# Patient Record
Sex: FEMALE | Race: WHITE | NOT HISPANIC OR LATINO | URBAN - METROPOLITAN AREA
[De-identification: names, ages, dates, MRNs, and addresses within clinical notes are randomized per-mention and may not be internally consistent; named-entity substitution may affect disease eponyms.]

---

## 2022-02-15 NOTE — PATIENT DISCUSSION
Patient prefers to stay with Biofinity if possible. Discusses removing nightly and cleaning with ClearCare to remove proteins. Also recommend rewetting drops throughout the day and BLINK-n-Clear drops to help with proteins. If continue to have deposits, check for GPC and consider adding daily antihistamine drop.

## 2022-03-03 ENCOUNTER — PREPPED CHART (OUTPATIENT)
Dept: URBAN - METROPOLITAN AREA CLINIC 10 | Facility: CLINIC | Age: 72
End: 2022-03-03

## 2022-03-03 ASSESSMENT — TONOMETRY
OS_IOP_MMHG: 8
OD_IOP_MMHG: 9

## 2022-03-07 ASSESSMENT — TONOMETRY
OD_IOP_MMHG: 12
OS_IOP_MMHG: 10

## 2022-03-29 NOTE — PATIENT DISCUSSION
Patient given trials for (monovision) contacts. OD +2.00 and +2.50 in Precision1 and OS +3.00 in DTO.

## 2022-04-05 NOTE — PROCEDURE NOTE: CLINICAL
PROCEDURE NOTE: Punctal Plugs, Extended OD. Diagnosis: Dry Eye Syndrome. Prior to treatment, the risks/benefits/alternatives were discussed. The patient wished to proceed with procedure. Patient tolerated procedure well. There were no complications. Post procedure instructions given. DURA 0.3.

## 2022-04-05 NOTE — PATIENT DISCUSSION
4/28/22 Spoke with patient, she is happy with +3.00 Biofinity OS and wants to try +2.50 Biofinity OD now that punctal plug is in and fog/dry eye has improved. Will set out trial OD for her to  - can order anytime if happy with Biofinity OU. Of the dailies, she did not like DTO and thought rest felt all the same (P1, Infuse, Biotrue). She has not tried DVO yet, has not played tennis since last visit. Tania Llanes.
5/17/22 Patient would like to finalize monthly lens in +2.50 / +3.00, Rx sent to patient. TOSHA.
Continue Systane tears QID OU.
Discussed normal floaters vs s/s of RD.
Follow.
Monitor.
Not visually significant at this time. Monitor.
Patient can call to finalize CL Rx or schedule CL f/u if needed.
Patient given more daily trials for (monovision) contacts. OD +2.50 and OS +3.00 in Precision1/INFUSE/Biotrue/DTO - patient can finalize in any daily brand. Also dispensed DVO trials OD +2.50 and OS +2.00 for tennis. Patient ed she can switch back and forth btw DVO and monovision.
Plug inserted RLL today, discussed possible LLL in future if OS becomes symptomatic.
Recommended observation.
Stable.
Switching to daily disposable contacts, helps with foggy vision but not dry eye symptoms OD.
with presbyopia.  Patient adapted better to monovision CLs vs MF CLs in past.
0 = independent

## 2022-05-17 ENCOUNTER — ESTABLISHED PATIENT (OUTPATIENT)
Dept: URBAN - METROPOLITAN AREA CLINIC 7 | Facility: CLINIC | Age: 72
End: 2022-05-17

## 2022-05-17 DIAGNOSIS — H10.9: ICD-10-CM

## 2022-05-17 PROCEDURE — 92012 INTRM OPH EXAM EST PATIENT: CPT

## 2022-07-05 RX ORDER — NETARSUDIL AND LATANOPROST OPHTHALMIC SOLUTION, 0.02%/0.005% .2; .05 MG/ML; MG/ML
SOLUTION/ DROPS OPHTHALMIC; TOPICAL
COMMUNITY

## 2022-07-05 RX ORDER — BRIMONIDINE TARTRATE, TIMOLOL MALEATE 2; 5 MG/ML; MG/ML
SOLUTION/ DROPS OPHTHALMIC
COMMUNITY

## 2022-07-05 RX ORDER — PREDNISONE 20 MG/1
TABLET ORAL
COMMUNITY

## 2022-07-05 RX ORDER — BENZONATATE 200 MG/1
1 CAPSULE ORAL
COMMUNITY

## 2022-09-07 ENCOUNTER — ESTABLISHED PATIENT (OUTPATIENT)
Dept: URBAN - METROPOLITAN AREA CLINIC 10 | Facility: CLINIC | Age: 72
End: 2022-09-07

## 2022-09-07 DIAGNOSIS — H52.4: ICD-10-CM

## 2022-09-07 DIAGNOSIS — H40.1132: ICD-10-CM

## 2022-09-07 DIAGNOSIS — H17.823: ICD-10-CM

## 2022-09-07 DIAGNOSIS — Z96.1: ICD-10-CM

## 2022-09-07 PROCEDURE — 92015 DETERMINE REFRACTIVE STATE: CPT

## 2022-09-07 PROCEDURE — 92250 FUNDUS PHOTOGRAPHY W/I&R: CPT

## 2022-09-07 PROCEDURE — 92014 COMPRE OPH EXAM EST PT 1/>: CPT

## 2022-09-07 ASSESSMENT — VISUAL ACUITY
OD_SC: 20/200
OU_SC: 20/40
OS_SC: 20/70-1

## 2022-09-07 ASSESSMENT — KERATOMETRY
OD_AXISANGLE_DEGREES: 60
OD_AXISANGLE2_DEGREES: 150
OD_K2POWER_DIOPTERS: 43.25
OS_AXISANGLE2_DEGREES: 50
OS_K1POWER_DIOPTERS: 43.00
OD_K1POWER_DIOPTERS: 43.00
OS_AXISANGLE_DEGREES: 140
OS_K2POWER_DIOPTERS: 44.25

## 2022-09-07 ASSESSMENT — TONOMETRY
OS_IOP_MMHG: 8
OD_IOP_MMHG: 7

## 2022-09-14 PROBLEM — M25.562 LEFT KNEE PAIN: Status: ACTIVE | Noted: 2022-09-14

## 2022-09-14 PROBLEM — M23.92 INTERNAL DERANGEMENT OF LEFT KNEE: Status: ACTIVE | Noted: 2022-09-14

## 2022-10-21 ASSESSMENT — KERATOMETRY
OS_AXISANGLE_DEGREES: 140
OD_K2POWER_DIOPTERS: 43.25
OD_K1POWER_DIOPTERS: 43.00
OD_AXISANGLE2_DEGREES: 150
OS_K1POWER_DIOPTERS: 43.00
OS_K2POWER_DIOPTERS: 44.25
OD_AXISANGLE_DEGREES: 60
OS_AXISANGLE2_DEGREES: 50

## 2022-10-24 ENCOUNTER — CONTACT LENSES/GLASSES VISIT (OUTPATIENT)
Dept: URBAN - METROPOLITAN AREA CLINIC 10 | Facility: CLINIC | Age: 72
End: 2022-10-24

## 2022-10-24 DIAGNOSIS — H52.4: ICD-10-CM

## 2022-10-24 PROCEDURE — 99213 OFFICE O/P EST LOW 20 MIN: CPT

## 2022-10-24 ASSESSMENT — VISUAL ACUITY
OU_CC: 20/25-2
OD_CC: 20/40
OS_CC: 20/40

## 2022-12-27 NOTE — PATIENT DISCUSSION
New CL trials dispensed - patient to try dailies. Discussed that blurred and fluctuating vision likely due to dry eye as MRx stable. Patient does not have traditional GRIFFIN, mostly affects vision in contacts.

## 2022-12-27 NOTE — PROCEDURE NOTE: CLINICAL
PROCEDURE NOTE: Punctal Plugs, Extended OU. Diagnosis: Dry Eye Syndrome. Prior to treatment, the risks/benefits/alternatives were discussed. The patient wished to proceed with procedure. Extended duration plugs were inserted. Brand: *. Size: *mm Location: * punctum. Patient tolerated procedure well. There were no complications. Post procedure instructions given. Noe Kelsey

## 2022-12-27 NOTE — PATIENT DISCUSSION
1/17/23 Patient stopped by the office, does not like +3.50 for OS - better near vision but distance blur and uncomfortable with driving. No difference in  comfort with monthly vs either daily. She would like to try +2.50 OD and +3.00 OS monovision in monthly vs daily again - trials dispensed. She would also like to try MF to better dist/near balance - trials dispensed. Patient will email me with preference to finalize CL Rx. EAB.

## 2022-12-27 NOTE — PATIENT DISCUSSION
Refractive Counseling: I have discussed the options for refractive surgery to decrease dependency on glasses and/or contact lenses. **Patient not a good candidate for LASIK enhancement due to hyperopia and dry eye OU. ** Discussed RLE option, patient interested if cannot get clear vision with contacts. It was emphasized to the patient that the goal of reducing spectacle dependence not spectacle freedom is more realistic. The patient understands it is possible they will still need a weak spectacle prescription to achieve visual target.

## 2023-09-11 ENCOUNTER — ESTABLISHED PATIENT (OUTPATIENT)
Dept: URBAN - METROPOLITAN AREA CLINIC 10 | Facility: CLINIC | Age: 73
End: 2023-09-11

## 2023-09-11 DIAGNOSIS — H40.1132: ICD-10-CM

## 2023-09-11 DIAGNOSIS — H52.223: ICD-10-CM

## 2023-09-11 PROCEDURE — 92014 COMPRE OPH EXAM EST PT 1/>: CPT

## 2023-09-11 PROCEDURE — 92250 FUNDUS PHOTOGRAPHY W/I&R: CPT

## 2023-09-11 PROCEDURE — 92015 DETERMINE REFRACTIVE STATE: CPT

## 2023-09-11 ASSESSMENT — VISUAL ACUITY
OS_CC: 20/50-1
OU_CC: 20/40
OD_CC: 20/40

## 2023-09-11 ASSESSMENT — KERATOMETRY
OS_AXISANGLE_DEGREES: 95
OD_K1POWER_DIOPTERS: 41.75
OD_K2POWER_DIOPTERS: 42.75
OD_AXISANGLE2_DEGREES: 165
OS_K2POWER_DIOPTERS: 43.00
OS_AXISANGLE2_DEGREES: 5
OD_AXISANGLE_DEGREES: 75
OS_K1POWER_DIOPTERS: 42.00

## 2023-09-11 ASSESSMENT — TONOMETRY
OS_IOP_MMHG: 10
OD_IOP_MMHG: 9

## 2024-11-27 ENCOUNTER — COMPREHENSIVE EXAM (OUTPATIENT)
Dept: URBAN - METROPOLITAN AREA CLINIC 10 | Facility: CLINIC | Age: 74
End: 2024-11-27

## 2024-11-27 DIAGNOSIS — H40.1133: ICD-10-CM

## 2024-11-27 DIAGNOSIS — H52.223: ICD-10-CM

## 2024-11-27 PROCEDURE — 92015 DETERMINE REFRACTIVE STATE: CPT

## 2024-11-27 PROCEDURE — 92250 FUNDUS PHOTOGRAPHY W/I&R: CPT

## 2024-11-27 PROCEDURE — 92014 COMPRE OPH EXAM EST PT 1/>: CPT
